# Patient Record
Sex: MALE | Race: WHITE
[De-identification: names, ages, dates, MRNs, and addresses within clinical notes are randomized per-mention and may not be internally consistent; named-entity substitution may affect disease eponyms.]

---

## 2020-10-09 ENCOUNTER — HOSPITAL ENCOUNTER (EMERGENCY)
Dept: HOSPITAL 57 - BURERS | Age: 50
Discharge: HOME | End: 2020-10-09
Payer: COMMERCIAL

## 2020-10-09 DIAGNOSIS — K80.20: ICD-10-CM

## 2020-10-09 DIAGNOSIS — F17.220: ICD-10-CM

## 2020-10-09 DIAGNOSIS — N13.2: Primary | ICD-10-CM

## 2020-10-09 LAB
BACTERIA UR QL AUTO: (no result) HPF
PROT UR STRIP.AUTO-MCNC: 30 MG/DL
RBC UR QL AUTO: (no result) HPF (ref 0–3)
SP GR UR STRIP: 1.02 (ref 1–1.04)
WBC UR QL AUTO: (no result) HPF (ref 0–3)

## 2020-10-09 PROCEDURE — 87086 URINE CULTURE/COLONY COUNT: CPT

## 2020-10-09 PROCEDURE — 74176 CT ABD & PELVIS W/O CONTRAST: CPT

## 2020-10-09 PROCEDURE — 96372 THER/PROPH/DIAG INJ SC/IM: CPT

## 2020-10-09 PROCEDURE — 81015 MICROSCOPIC EXAM OF URINE: CPT

## 2020-10-09 PROCEDURE — 81003 URINALYSIS AUTO W/O SCOPE: CPT

## 2020-10-09 NOTE — CT
PRELIMINARY REPORT/[facility]/EMERGENCY AFTER HOURS PROCEDURE: 

 

 

EXAM: 

CT Abdomen and Pelvis Without Intravenous Contrast 

 

CLINICAL HISTORY: 

LT FLANK PAIN THAT STARTED AT 4 AM, PRIOR HX OF KIDNEY STONES PER PT 

 

TECHNIQUE: 

Axial computed tomography images of the abdomen and pelvis without intravenous contrast. 

 

CONTRAST: 

None. 

 

COMPARISON: 

None provided. 

 

FINDINGS: 

 

LUNG BASES: 

No basilar airspace consolidation or pleural effusion. 

 

LIVER: 

Unremarkable. 

 

GALLBLADDER AND BILE DUCTS: 

Tiny gallstones. No ductal dilation. 

 

PANCREAS: 

Unremarkable. 

 

SPLEEN: 

Unremarkable. 

 

ADRENAL GLANDS: 

Unremarkable. 

 

KIDNEYS, URETERS, AND BLADDER: 

Mild hydronephrosis and hydroureter on the left with a 1 mm UVJ stone.  No intrarenal stones are seen
 in either kidney. 

 

STOMACH AND BOWEL: 

Evaluation of the GI tract is limited due to lack of contrast and limited distention of the GI tract.
  No obvious, acute GI abnormalities are identified. 

 

APPENDIX: 

Normal appendix not clearly identified but no specific CT indications of acute appendicitis. 

 

PERITONEUM: 

No free fluid. No free air. 

 

LYMPH NODES: 

No lymphadenopathy. 

 

VASCULATURE: 

No aortic aneurysm. 

 

ABDOMINAL WALL AND SOFT TISSUES: 

Unremarkable. 

 

BONES: 

No fracture or suspicious osseous abnormality. 

 

IMPRESSION: 

 

Small UVJ stone on the left with hydronephrosis..  Small gallstones.

 

 

 

 

ELECTRONICALLY SIGNED BY:

Otilio Canchola MD

Oct 9, 2020 5:36:34 AM CDT

 

This report is intended for review by the ordering physician only, in accordance of law. If you recei
ve this report in error, please call [facility] at [phone].

 

 

 

 

 

 

 

 

FINAL REPORT 

 

 

CT ABDOMEN AND PELVIS WITHOUT CONTRAST:

 

Date:  10/09/2020

 

Spiral CT of the abdomen and pelvis was done without oral or IV contrast for evaluation of flank pain
. 

 

Major finding on this study is a 2.0 mm distal left ureteral calculus at the UVJ that is causing very
 mild left hydronephrosis and hydroureter. I do not see any remaining stones in either kidney. 

 

The lung bases are clear. The liver, spleen, pancreas, adrenal glands, and abdominal aorta show no ac
[place] findings. Several small gallstones were seen in the gallbladder. The bowel shows no distention, w
all thickening, or inflammatory change. No free air or free fluid was present. 

 

CT of the pelvis shows no pelvic masses, fluid collections, or inflammatory changes. 

 

Incidental findings were a few mildly prominent lymph nodes in the inguinal/femoral regions bilateral
ly, more so on the right. A tiny calcification was seen in the dorsum of the penis, probably not sign
ificant. 

 

IMPRESSION: 

1.  2.0 mm distal left ureteral calculus at the UVJ causing mild left hydronephrosis and hydroureter.
 

 

2.  Tiny gallstones. 

 

Report in agreement with preliminary reading by [facility]. 

 

 

POS: HOME

## 2023-06-22 ENCOUNTER — HOSPITAL ENCOUNTER (EMERGENCY)
Dept: HOSPITAL 57 - BURERS | Age: 53
Discharge: HOME | End: 2023-06-22
Payer: COMMERCIAL

## 2023-06-22 DIAGNOSIS — F17.220: ICD-10-CM

## 2023-06-22 DIAGNOSIS — Z23: ICD-10-CM
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
## 2023-06-22 DIAGNOSIS — S61.411A: Primary | ICD-10-CM

## 2023-06-22 PROCEDURE — 90471 IMMUNIZATION ADMIN: CPT

## 2023-06-22 PROCEDURE — 90715 TDAP VACCINE 7 YRS/> IM: CPT

## 2023-06-22 PROCEDURE — 12002 RPR S/N/AX/GEN/TRNK2.6-7.5CM: CPT
